# Patient Record
Sex: FEMALE | Race: WHITE | NOT HISPANIC OR LATINO | Employment: OTHER | URBAN - METROPOLITAN AREA
[De-identification: names, ages, dates, MRNs, and addresses within clinical notes are randomized per-mention and may not be internally consistent; named-entity substitution may affect disease eponyms.]

---

## 2021-01-28 NOTE — PROCEDURE NOTE: CLINICAL
PROCEDURE NOTE: Punctal Plugs, Linda Valiente ([id number], [id number]) #1 OU. Diagnosis: Keratoconjunctivitis Sicca, Not Specified As Sjögren's. Prior to treatment, the risks/benefits/alternatives were discussed. The patient wished to proceed with procedure. Temporary collagen plugs were inserted. Patient tolerated procedure well. There were no complications. Post procedure instructions given. Pedrito Cox

## 2021-05-21 ENCOUNTER — NEW PATIENT COMPREHENSIVE (OUTPATIENT)
Dept: URBAN - METROPOLITAN AREA CLINIC 39 | Facility: CLINIC | Age: 76
End: 2021-05-21

## 2021-05-21 DIAGNOSIS — H04.123: ICD-10-CM

## 2021-05-21 DIAGNOSIS — H35.30: ICD-10-CM

## 2021-05-21 DIAGNOSIS — H43.813: ICD-10-CM

## 2021-05-21 PROCEDURE — 92015 DETERMINE REFRACTIVE STATE: CPT

## 2021-05-21 PROCEDURE — 92004 COMPRE OPH EXAM NEW PT 1/>: CPT

## 2021-05-21 ASSESSMENT — KERATOMETRY
OS_AXISANGLE_DEGREES: 103
OD_AXISANGLE_DEGREES: 87
OS_K1POWER_DIOPTERS: 42.50
OD_K2POWER_DIOPTERS: 43.75
OS_AXISANGLE2_DEGREES: 13
OD_K1POWER_DIOPTERS: 43.00
OS_K2POWER_DIOPTERS: 43.75
OD_AXISANGLE2_DEGREES: 177

## 2021-05-21 ASSESSMENT — VISUAL ACUITY
OS_CC: 20/50
OU_SC: J1+
OU_CC: 20/40
OD_SC: J1+
OU_SC: 20/70-1
OS_SC: J1
OD_CC: 20/50+2
OD_SC: 20/80-1
OS_SC: 20/80

## 2021-05-21 ASSESSMENT — TONOMETRY
OS_IOP_MMHG: 15
OD_IOP_MMHG: 15

## 2021-06-01 ENCOUNTER — CONTACT LENS FOLLOW UP (OUTPATIENT)
Dept: URBAN - METROPOLITAN AREA CLINIC 39 | Facility: CLINIC | Age: 76
End: 2021-06-01

## 2021-06-01 DIAGNOSIS — H52.13: ICD-10-CM

## 2021-06-01 PROCEDURE — 92310F

## 2021-06-01 ASSESSMENT — KERATOMETRY
OS_AXISANGLE2_DEGREES: 13
OS_K1POWER_DIOPTERS: 42.50
OD_AXISANGLE_DEGREES: 87
OD_AXISANGLE2_DEGREES: 177
OD_K2POWER_DIOPTERS: 43.75
OD_K1POWER_DIOPTERS: 43.00
OS_AXISANGLE_DEGREES: 103
OS_K2POWER_DIOPTERS: 43.75

## 2021-06-01 ASSESSMENT — VISUAL ACUITY
OU_CC: 20/30
OS_CC: 20/60

## 2022-07-29 ENCOUNTER — COMPREHENSIVE EXAM (OUTPATIENT)
Dept: URBAN - METROPOLITAN AREA CLINIC 39 | Facility: CLINIC | Age: 77
End: 2022-07-29

## 2022-07-29 DIAGNOSIS — H52.13: ICD-10-CM

## 2022-07-29 PROCEDURE — 92015 DETERMINE REFRACTIVE STATE: CPT

## 2022-07-29 PROCEDURE — 92014 COMPRE OPH EXAM EST PT 1/>: CPT

## 2022-07-29 ASSESSMENT — VISUAL ACUITY
OD_SC: J1
OD_CC: 20/25+1
OS_SC: 20/200+1
OD_SC: 20/100-1
OU_CC: 20/20-1
OS_SC: J1
OS_CC: 20/25+1

## 2022-07-29 ASSESSMENT — TONOMETRY
OS_IOP_MMHG: 18
OD_IOP_MMHG: 18

## 2022-07-29 ASSESSMENT — KERATOMETRY
OS_K1POWER_DIOPTERS: 42.50
OS_K2POWER_DIOPTERS: 43.75
OD_K1POWER_DIOPTERS: 43.00
OD_K2POWER_DIOPTERS: 43.75
OS_AXISANGLE2_DEGREES: 13
OD_AXISANGLE_DEGREES: 87
OD_AXISANGLE2_DEGREES: 177
OS_AXISANGLE_DEGREES: 103

## 2023-07-28 ENCOUNTER — COMPREHENSIVE EXAM (OUTPATIENT)
Dept: URBAN - METROPOLITAN AREA CLINIC 39 | Facility: CLINIC | Age: 78
End: 2023-07-28

## 2023-07-28 DIAGNOSIS — H52.203: ICD-10-CM

## 2023-07-28 DIAGNOSIS — H52.13: ICD-10-CM

## 2023-07-28 PROCEDURE — 92014 COMPRE OPH EXAM EST PT 1/>: CPT

## 2023-07-28 PROCEDURE — 92015 DETERMINE REFRACTIVE STATE: CPT

## 2023-07-28 ASSESSMENT — KERATOMETRY
OS_K2POWER_DIOPTERS: 43.75
OS_AXISANGLE2_DEGREES: 13
OS_AXISANGLE_DEGREES: 103
OD_K2POWER_DIOPTERS: 43.75
OD_AXISANGLE2_DEGREES: 177
OD_K1POWER_DIOPTERS: 43.00
OD_AXISANGLE_DEGREES: 87
OS_K1POWER_DIOPTERS: 42.50

## 2023-07-28 ASSESSMENT — TONOMETRY
OS_IOP_MMHG: 19
OD_IOP_MMHG: 19

## 2023-07-28 ASSESSMENT — VISUAL ACUITY
OU_SC: J1
OD_SC: J2
OU_CC: 20/20-2
OU_SC: 20/100
OS_CC: 20/20-2
OS_SC: J2
OD_SC: 20/100
OD_CC: 20/25
OS_SC: 20/100

## 2024-07-30 ENCOUNTER — COMPREHENSIVE EXAM (OUTPATIENT)
Dept: URBAN - METROPOLITAN AREA CLINIC 39 | Facility: CLINIC | Age: 79
End: 2024-07-30

## 2024-07-30 DIAGNOSIS — H04.123: ICD-10-CM

## 2024-07-30 DIAGNOSIS — H35.30: ICD-10-CM

## 2024-07-30 DIAGNOSIS — H52.203: ICD-10-CM

## 2024-07-30 DIAGNOSIS — H52.13: ICD-10-CM

## 2024-07-30 DIAGNOSIS — H40.053: ICD-10-CM

## 2024-07-30 DIAGNOSIS — H43.813: ICD-10-CM

## 2024-07-30 PROCEDURE — 92250 FUNDUS PHOTOGRAPHY W/I&R: CPT | Mod: 25

## 2024-07-30 PROCEDURE — 92015 DETERMINE REFRACTIVE STATE: CPT

## 2024-07-30 PROCEDURE — 92014 COMPRE OPH EXAM EST PT 1/>: CPT

## 2024-07-30 ASSESSMENT — KERATOMETRY
OS_AXISANGLE2_DEGREES: 13
OS_K1POWER_DIOPTERS: 42.50
OD_AXISANGLE2_DEGREES: 177
OS_K2POWER_DIOPTERS: 43.75
OS_AXISANGLE_DEGREES: 103
OD_AXISANGLE_DEGREES: 87
OD_K2POWER_DIOPTERS: 43.75
OD_K1POWER_DIOPTERS: 43.00

## 2024-07-30 ASSESSMENT — VISUAL ACUITY
OD_CC: 20/25
OS_SC: J1 @ 18"
OD_SC: J1+ @ 18"
OS_CC: 20/25
OS_SC: 20/100
OD_SC: 20/50

## 2024-07-30 ASSESSMENT — TONOMETRY
OD_IOP_MMHG: 21
OS_IOP_MMHG: 23

## 2024-09-13 ENCOUNTER — FOLLOW UP (OUTPATIENT)
Dept: URBAN - METROPOLITAN AREA CLINIC 39 | Facility: CLINIC | Age: 79
End: 2024-09-13

## 2024-09-13 DIAGNOSIS — H40.053: ICD-10-CM

## 2024-09-13 PROCEDURE — 92083 EXTENDED VISUAL FIELD XM: CPT | Mod: 25

## 2024-09-13 PROCEDURE — 92012 INTRM OPH EXAM EST PATIENT: CPT

## 2024-09-13 RX ORDER — LATANOPROST 50 UG/ML
1 SOLUTION/ DROPS OPHTHALMIC
Start: 2024-09-13

## 2024-10-24 ENCOUNTER — FOLLOW UP (OUTPATIENT)
Dept: URBAN - METROPOLITAN AREA CLINIC 39 | Facility: CLINIC | Age: 79
End: 2024-10-24

## 2024-10-24 DIAGNOSIS — H40.013: ICD-10-CM

## 2024-10-24 DIAGNOSIS — H40.053: ICD-10-CM

## 2024-10-24 PROCEDURE — 92012 INTRM OPH EXAM EST PATIENT: CPT

## 2024-10-24 PROCEDURE — 76514 ECHO EXAM OF EYE THICKNESS: CPT | Mod: 25

## 2025-04-24 ENCOUNTER — FOLLOW UP (OUTPATIENT)
Age: 80
End: 2025-04-24

## 2025-04-24 DIAGNOSIS — H40.013: ICD-10-CM

## 2025-04-24 DIAGNOSIS — H40.053: ICD-10-CM

## 2025-04-24 PROCEDURE — 92133 CPTRZD OPH DX IMG PST SGM ON: CPT | Mod: 25

## 2025-04-24 PROCEDURE — 92012 INTRM OPH EXAM EST PATIENT: CPT

## 2025-05-06 ENCOUNTER — EMERGENCY VISIT (OUTPATIENT)
Age: 80
End: 2025-05-06

## 2025-05-06 DIAGNOSIS — H04.123: ICD-10-CM

## 2025-05-06 PROCEDURE — 92012 INTRM OPH EXAM EST PATIENT: CPT
